# Patient Record
Sex: FEMALE | Race: WHITE | NOT HISPANIC OR LATINO | Employment: UNEMPLOYED | URBAN - METROPOLITAN AREA
[De-identification: names, ages, dates, MRNs, and addresses within clinical notes are randomized per-mention and may not be internally consistent; named-entity substitution may affect disease eponyms.]

---

## 2023-02-27 LAB
ANTI-CENTROMERE: <0.2 AI
C REACTIVE PROTEIN (MG/L) IN SER/PLAS: 0.12 MG/DL
RHEUMATOID FACTOR (IU/ML) IN SERUM OR PLASMA: <10 IU/ML (ref 0–15)

## 2023-02-28 LAB — ANTI-NUCLEAR ANTIBODY (ANA): NEGATIVE

## 2023-09-27 PROBLEM — K58.9 IBS (IRRITABLE BOWEL SYNDROME): Status: ACTIVE | Noted: 2023-09-27

## 2023-09-27 PROBLEM — N94.6 DYSMENORRHEA: Status: ACTIVE | Noted: 2023-09-27

## 2023-09-27 PROBLEM — I73.00 RAYNAUD'S PHENOMENON: Status: ACTIVE | Noted: 2023-09-27

## 2023-09-27 PROBLEM — F41.9 ANXIETY AND DEPRESSION: Status: ACTIVE | Noted: 2023-09-27

## 2023-09-27 PROBLEM — F32.A ANXIETY AND DEPRESSION: Status: ACTIVE | Noted: 2023-09-27

## 2023-09-27 PROBLEM — N83.202 HEMORRHAGIC CYST OF LEFT OVARY: Status: ACTIVE | Noted: 2023-09-27

## 2023-09-27 PROBLEM — G44.209 TENSION HEADACHE: Status: ACTIVE | Noted: 2023-09-27

## 2023-09-27 RX ORDER — LORAZEPAM 0.5 MG/1
0.5 TABLET ORAL DAILY PRN
COMMUNITY

## 2023-09-27 RX ORDER — HYDROXYZINE PAMOATE 100 MG/1
CAPSULE ORAL
COMMUNITY
End: 2023-10-16

## 2023-09-27 RX ORDER — NORETHINDRONE ACETATE AND ETHINYL ESTRADIOL 1MG-20(21)
1 KIT ORAL DAILY
COMMUNITY
Start: 2022-07-11 | End: 2023-10-16

## 2023-09-27 RX ORDER — AMLODIPINE BESYLATE 2.5 MG/1
1 TABLET ORAL DAILY
COMMUNITY
Start: 2023-02-20 | End: 2023-10-16

## 2023-10-03 ENCOUNTER — APPOINTMENT (OUTPATIENT)
Dept: RHEUMATOLOGY | Facility: CLINIC | Age: 23
End: 2023-10-03
Payer: COMMERCIAL

## 2023-10-06 ENCOUNTER — TELEPHONE (OUTPATIENT)
Dept: PRIMARY CARE | Facility: CLINIC | Age: 23
End: 2023-10-06
Payer: COMMERCIAL

## 2023-10-06 DIAGNOSIS — N39.0 ACUTE LOWER UTI: Primary | ICD-10-CM

## 2023-10-06 RX ORDER — CIPROFLOXACIN 500 MG/1
500 TABLET ORAL 2 TIMES DAILY
Qty: 14 TABLET | Refills: 0 | Status: SHIPPED | OUTPATIENT
Start: 2023-10-06 | End: 2023-10-18 | Stop reason: ALTCHOICE

## 2023-10-06 NOTE — TELEPHONE ENCOUNTER
Pt went to urgent care 10/1 & -she thought she had uti.  Came back negative.  Pain in back & pelvic area-worse w/urination.  [phone]  No allergies  Will make cpe

## 2023-10-14 ASSESSMENT — ENCOUNTER SYMPTOMS
BACK PAIN: 1
WEAKNESS: 0
PARESTHESIAS: 0
DYSURIA: 1
PERIANAL NUMBNESS: 0
WEIGHT LOSS: 0
FEVER: 0
ABDOMINAL PAIN: 0
TINGLING: 0
PARESIS: 0
BOWEL INCONTINENCE: 0
LEG PAIN: 1
HEADACHES: 0
NUMBNESS: 0

## 2023-10-16 ENCOUNTER — APPOINTMENT (OUTPATIENT)
Dept: PRIMARY CARE | Facility: CLINIC | Age: 23
End: 2023-10-16
Payer: COMMERCIAL

## 2023-10-16 RX ORDER — NITROFURANTOIN 25; 75 MG/1; MG/1
100 CAPSULE ORAL EVERY 12 HOURS
COMMUNITY
Start: 2023-10-01 | End: 2023-10-06

## 2023-10-16 RX ORDER — MULTIVITAMIN
TABLET ORAL
COMMUNITY
End: 2023-10-18 | Stop reason: ALTCHOICE

## 2023-10-16 RX ORDER — LITHIUM CARBONATE 450 MG/1
450 TABLET ORAL NIGHTLY
COMMUNITY
Start: 2023-10-03 | End: 2024-02-19 | Stop reason: WASHOUT

## 2023-10-16 RX ORDER — CLONIDINE HYDROCHLORIDE 0.1 MG/1
TABLET ORAL
COMMUNITY
Start: 2023-09-01 | End: 2023-10-18 | Stop reason: ALTCHOICE

## 2023-10-16 RX ORDER — FLUOXETINE HYDROCHLORIDE 20 MG/1
CAPSULE ORAL
COMMUNITY
End: 2023-10-18 | Stop reason: ALTCHOICE

## 2023-10-16 RX ORDER — CARBAMAZEPINE 100 MG/1
100 TABLET, EXTENDED RELEASE ORAL 2 TIMES DAILY
COMMUNITY
Start: 2022-11-03 | End: 2023-10-18 | Stop reason: ALTCHOICE

## 2023-10-16 RX ORDER — CYANOCOBALAMIN (VITAMIN B-12) 500 MCG
TABLET ORAL
COMMUNITY
Start: 2023-09-20

## 2023-10-16 RX ORDER — AMLODIPINE BESYLATE 5 MG/1
5 TABLET ORAL DAILY
COMMUNITY
Start: 2023-09-08 | End: 2023-12-11 | Stop reason: SDUPTHER

## 2023-10-16 RX ORDER — NORTRIPTYLINE HYDROCHLORIDE 10 MG/1
CAPSULE ORAL
COMMUNITY
Start: 2023-09-25 | End: 2023-10-18 | Stop reason: ALTCHOICE

## 2023-10-18 ENCOUNTER — OFFICE VISIT (OUTPATIENT)
Dept: PRIMARY CARE | Facility: CLINIC | Age: 23
End: 2023-10-18
Payer: COMMERCIAL

## 2023-10-18 VITALS
BODY MASS INDEX: 20.55 KG/M2 | OXYGEN SATURATION: 99 % | DIASTOLIC BLOOD PRESSURE: 86 MMHG | SYSTOLIC BLOOD PRESSURE: 122 MMHG | HEART RATE: 100 BPM | WEIGHT: 114.2 LBS

## 2023-10-18 DIAGNOSIS — N39.0 ACUTE LOWER UTI: Primary | ICD-10-CM

## 2023-10-18 PROBLEM — M99.09 SOMATIC DYSFUNCTION OF BACK: Status: ACTIVE | Noted: 2023-10-18

## 2023-10-18 PROBLEM — R63.4 WEIGHT LOSS: Status: ACTIVE | Noted: 2023-10-18

## 2023-10-18 PROBLEM — F41.9 ANXIETY: Status: ACTIVE | Noted: 2023-10-18

## 2023-10-18 PROBLEM — R51.9 ACUTE HEADACHE: Status: ACTIVE | Noted: 2023-10-18

## 2023-10-18 PROBLEM — R30.0 DYSURIA: Status: ACTIVE | Noted: 2023-10-18

## 2023-10-18 PROBLEM — T14.91XA SUICIDE ATTEMPT (MULTI): Status: ACTIVE | Noted: 2023-10-18

## 2023-10-18 PROBLEM — N83.209 HEMORRHAGIC CYST OF OVARY: Status: ACTIVE | Noted: 2023-05-18

## 2023-10-18 PROBLEM — F43.10 POSTTRAUMATIC STRESS DISORDER: Status: ACTIVE | Noted: 2023-09-27

## 2023-10-18 PROBLEM — F50.9 EATING DISORDER: Status: ACTIVE | Noted: 2023-10-18

## 2023-10-18 PROBLEM — F31.81 BIPOLAR II DISORDER (MULTI): Status: ACTIVE | Noted: 2023-10-16

## 2023-10-18 PROBLEM — T50.901A DRUG OVERDOSE: Status: ACTIVE | Noted: 2023-10-18

## 2023-10-18 PROBLEM — F60.3 BORDERLINE PERSONALITY DISORDER (MULTI): Status: ACTIVE | Noted: 2023-10-18

## 2023-10-18 LAB
APPEARANCE UR: CLEAR
BILIRUB UR QL STRIP: NEGATIVE
COLOR UR: YELLOW
GLUCOSE UR STRIP-MCNC: NEGATIVE MG/DL
HGB UR QL STRIP: NEGATIVE
KETONES UR STRIP-MCNC: NEGATIVE MG/DL
LEUKOCYTE ESTERASE UR QL STRIP: NEGATIVE
NITRITE UR QL STRIP: NEGATIVE
PH UR STRIP: 7 [PH]
PROT UR STRIP-MCNC: NEGATIVE MG/DL
SP GR UR STRIP.AUTO: 1.01
UROBILINOGEN UR STRIP-ACNC: 0.2 E.U./DL

## 2023-10-18 PROCEDURE — 81003 URINALYSIS AUTO W/O SCOPE: CPT | Performed by: STUDENT IN AN ORGANIZED HEALTH CARE EDUCATION/TRAINING PROGRAM

## 2023-10-18 PROCEDURE — 99213 OFFICE O/P EST LOW 20 MIN: CPT | Performed by: STUDENT IN AN ORGANIZED HEALTH CARE EDUCATION/TRAINING PROGRAM

## 2023-10-18 PROCEDURE — 87086 URINE CULTURE/COLONY COUNT: CPT

## 2023-10-18 PROCEDURE — 1036F TOBACCO NON-USER: CPT | Performed by: STUDENT IN AN ORGANIZED HEALTH CARE EDUCATION/TRAINING PROGRAM

## 2023-10-18 RX ORDER — CIPROFLOXACIN 500 MG/1
500 TABLET ORAL 2 TIMES DAILY
Qty: 14 TABLET | Refills: 0 | Status: SHIPPED | OUTPATIENT
Start: 2023-10-18 | End: 2023-10-25

## 2023-10-18 RX ORDER — MULTIVITAMIN
1 TABLET ORAL DAILY
COMMUNITY
Start: 2009-12-02 | End: 2023-10-18 | Stop reason: ALTCHOICE

## 2023-10-18 RX ORDER — PAROXETINE HYDROCHLORIDE 20 MG/1
20 TABLET, FILM COATED ORAL DAILY
COMMUNITY
Start: 2023-03-02 | End: 2023-10-18 | Stop reason: ALTCHOICE

## 2023-10-18 RX ORDER — OLANZAPINE 2.5 MG/1
2.5 TABLET ORAL NIGHTLY
COMMUNITY
Start: 2022-12-29 | End: 2023-10-18 | Stop reason: ALTCHOICE

## 2023-10-18 RX ORDER — PAROXETINE 10 MG/1
5 TABLET, FILM COATED ORAL
COMMUNITY
Start: 2023-07-07 | End: 2023-10-18 | Stop reason: ALTCHOICE

## 2023-10-18 RX ORDER — MAGNESIUM L-LACTATE 84 MG
84 TABLET, EXTENDED RELEASE ORAL DAILY
COMMUNITY
Start: 2022-04-29

## 2023-10-18 ASSESSMENT — PAIN SCALES - GENERAL: PAINLEVEL: 0-NO PAIN

## 2023-10-18 ASSESSMENT — ENCOUNTER SYMPTOMS: DEPRESSION: 0

## 2023-10-18 NOTE — PROGRESS NOTES
Subjective   Patient ID: Alondra Hood is a 23 y.o. female who presents for Follow-up (UTI. Still has some back pain.).    HPI comes in for flank pain UTI follow-up    Review of Systems  Constitutional: NO F, chills, or sweats  Eyes: no blurred vision or visual disturbance  ENT: no hearing loss, no congestion, no nasal discharge, no hoarseness and no sore throat.   Cardiovascular: no chest pain, no edema, no palps and no syncope.   Respiratory: no cough,no s.o.b. and no wheezing  Gastrointestinal: no abdominal pain, No C/D no N/V, no blood in stools  Genitourinary: Some mild flank pain and increased urinary frequency  Objective   /86 (BP Location: Right arm, Patient Position: Sitting, BP Cuff Size: Adult)   Pulse 100   Wt 51.8 kg (114 lb 3.2 oz)   SpO2 99%   BMI 20.55 kg/m²     Physical Exam  gen- a & o x 3, nad, pleasant  heent- eomi, perrla, ear canals patent, TM's non-erythematous, no fluid, frontal and maxillary sinus's nontender  neck- supple, nontender, no palpable or enlarged nodes, no thyromegaly  heart- rrr, no murmurs  lungs- cta b/l , no w/r/r  chest- symmetric, nontender  ab- soft, positive mild bilateral CVA TTP  Assessment/Plan     1.  Concern for UTI.  She has mild flank pain bilaterally.  She originally went to urgent care she also had increased urinary frequency.  She is somewhat improved after 1 course of antibiotics.  We will culture her urine.  Advised on Cipro p.o. twice daily x7 more days.

## 2023-10-18 NOTE — PATIENT INSTRUCTIONS
1.  Concern for UTI.  She has mild flank pain bilaterally.  She originally went to urgent care she also had increased urinary frequency.  She is somewhat improved after 1 course of antibiotics.  We will culture her urine.  Advised on Cipro p.o. twice daily x7 more days.    Will send urine for culture and sensitivity.  Instructed the patient to drink plenty of fluids.  May try cranberry capsules over the counter for prevention.  Discussed ways of reducing chance of recurrence including voiding when sense the urge, urinating after sex and baths/swimming/hot tubs, and wearing cotton underwear.   Take Tylenol or Motrin/Aleve as needed for pain.   Complete all antibiotics until completes.   If you were prescribed Pyridium (phenazopyridine) for urinary burning, only take up to 3 days.

## 2023-10-19 LAB — BACTERIA UR CULT: NORMAL

## 2023-11-02 ENCOUNTER — OFFICE VISIT (OUTPATIENT)
Dept: OBSTETRICS AND GYNECOLOGY | Facility: CLINIC | Age: 23
End: 2023-11-02
Payer: COMMERCIAL

## 2023-11-02 VITALS
WEIGHT: 114.1 LBS | SYSTOLIC BLOOD PRESSURE: 115 MMHG | DIASTOLIC BLOOD PRESSURE: 77 MMHG | HEIGHT: 63 IN | BODY MASS INDEX: 20.21 KG/M2

## 2023-11-02 DIAGNOSIS — R10.2 PELVIC PAIN: Primary | ICD-10-CM

## 2023-11-02 DIAGNOSIS — R30.0 DYSURIA: ICD-10-CM

## 2023-11-02 LAB
POC APPEARANCE, URINE: CLEAR
POC BILIRUBIN, URINE: NEGATIVE
POC BLOOD, URINE: NEGATIVE
POC COLOR, URINE: YELLOW
POC GLUCOSE, URINE: NEGATIVE MG/DL
POC KETONES, URINE: NEGATIVE MG/DL
POC LEUKOCYTES, URINE: NEGATIVE
POC NITRITE,URINE: NEGATIVE
POC PH, URINE: 7 PH
POC PROTEIN, URINE: NEGATIVE MG/DL
POC SPECIFIC GRAVITY, URINE: <=1.005
POC UROBILINOGEN, URINE: 0.2 EU/DL

## 2023-11-02 PROCEDURE — 87086 URINE CULTURE/COLONY COUNT: CPT

## 2023-11-02 PROCEDURE — 1036F TOBACCO NON-USER: CPT | Performed by: OBSTETRICS & GYNECOLOGY

## 2023-11-02 PROCEDURE — 81003 URINALYSIS AUTO W/O SCOPE: CPT | Performed by: OBSTETRICS & GYNECOLOGY

## 2023-11-02 PROCEDURE — 99213 OFFICE O/P EST LOW 20 MIN: CPT | Performed by: OBSTETRICS & GYNECOLOGY

## 2023-11-02 NOTE — PROGRESS NOTES
Subjective   Patient ID: Alondra Hood is a 23 y.o. female who presents for Pelvic Pain (Patient here for c/o pelvic pain--was treated with 2 rounds of antibx by pcp and pelvic pain is still there--denies any irregular bleeding or difficulty urinating-states theres a slight odor to her urine/Pt would like a chaperone during exam-- Ryanne Pena CMA was present).  HPI  Patient is a 23-year-old  0 para 0 white female whose last menstrual period was about 3 weeks ago she said they are typically 1 month apart she will bleed for about 6 to 7 days.  Patient has never been sexually active nor has she ever penetrated the vagina.  Several years ago did take birth control pills for about 1 to 2 weeks then stop because of symptoms.  She admits to regular bowel movements.  Patient states when she urinates she has significant low pelvic and back pain.  She said this has been going on for about a month she did see her internist diagnosed with UTI and treated then symptoms recurred went to urgent care and told urine culture was negative.  Continues to have symptoms.  Denies fever or chills.  Denies blood in the urine or stool.  Medical history significant for anxiety and depression.  She denies cigarette drug or alcohol use.  Currently works at [employer].  Family history negative for breast pelvic or colon cancer.  Patient has never had a Pap smear.  Denies fibroids, pelvic infections or STDs.  Remote history of ovarian cyst.  Review of Systems    Objective   Physical Exam  Abdomen is soft mildly tender to deep palpation in the right lower quadrant without rebound or guarding.  Pelvic: External genitalia normal, unable to pass finger or small speculum into the vagina therefore procedure discontinued.    Assessment/Plan   Pelvic pain and dysuria, will get pelvic ultrasound and urine culture.  In light of patient not being sexually active unlikely concern for STDs.  We will contact patient with results and proceed  accordingly.

## 2023-11-03 LAB — BACTERIA UR CULT: ABNORMAL

## 2023-11-04 DIAGNOSIS — N30.90 CYSTITIS: Primary | ICD-10-CM

## 2023-11-04 RX ORDER — AMOXICILLIN 500 MG/1
500 TABLET, FILM COATED ORAL EVERY 8 HOURS SCHEDULED
Qty: 21 TABLET | Refills: 0 | Status: SHIPPED | OUTPATIENT
Start: 2023-11-04 | End: 2023-11-11

## 2023-11-04 NOTE — RESULT ENCOUNTER NOTE
Spoke to patient on the phone.  Informed her of urine culture positive for group B strep.  Electronically sent amoxicillin 500 mg 3 times daily x7 days to pharmacy on file.  Patient is scheduled to have pelvic ultrasound next week.  We will contact her with ultrasound results.

## 2023-11-10 ENCOUNTER — HOSPITAL ENCOUNTER (OUTPATIENT)
Dept: RADIOLOGY | Facility: HOSPITAL | Age: 23
Discharge: HOME | End: 2023-11-10
Payer: COMMERCIAL

## 2023-11-10 DIAGNOSIS — R10.2 PELVIC PAIN: ICD-10-CM

## 2023-11-10 PROCEDURE — 76856 US EXAM PELVIC COMPLETE: CPT

## 2023-11-10 PROCEDURE — 76830 TRANSVAGINAL US NON-OB: CPT | Performed by: RADIOLOGY

## 2023-11-10 PROCEDURE — 76856 US EXAM PELVIC COMPLETE: CPT | Performed by: RADIOLOGY

## 2023-11-16 ENCOUNTER — TELEPHONE (OUTPATIENT)
Dept: OBSTETRICS AND GYNECOLOGY | Facility: CLINIC | Age: 23
End: 2023-11-16

## 2023-11-17 ENCOUNTER — TELEPHONE (OUTPATIENT)
Dept: OBSTETRICS AND GYNECOLOGY | Facility: CLINIC | Age: 23
End: 2023-11-17
Payer: COMMERCIAL

## 2023-11-17 NOTE — TELEPHONE ENCOUNTER
Spoke to patient on the phone.  Reviewed recent normal pelvic ultrasound.  Patient did complete her antibiotics for urinary tract infection.  Patient states symptoms unchanged since office visit.  I did suggest starting a low-dose birth control pill.  Patient said she would like to think about this but for right now would prefer not to.  Recommend she follow-up in office or call if she has any other questions.

## 2023-11-23 ASSESSMENT — ENCOUNTER SYMPTOMS
PARESTHESIAS: 0
PARESIS: 0
HEADACHES: 1
BOWEL INCONTINENCE: 0
NUMBNESS: 0
FEVER: 0
WEAKNESS: 0
DYSURIA: 1
BACK PAIN: 1
PERIANAL NUMBNESS: 0
TINGLING: 0
WEIGHT LOSS: 0
ABDOMINAL PAIN: 0
LEG PAIN: 1

## 2023-11-27 ENCOUNTER — OFFICE VISIT (OUTPATIENT)
Dept: PRIMARY CARE | Facility: CLINIC | Age: 23
End: 2023-11-27
Payer: COMMERCIAL

## 2023-11-27 VITALS — BODY MASS INDEX: 20.37 KG/M2 | WEIGHT: 113.2 LBS | SYSTOLIC BLOOD PRESSURE: 116 MMHG | DIASTOLIC BLOOD PRESSURE: 86 MMHG

## 2023-11-27 DIAGNOSIS — N39.0 ACUTE LOWER UTI: Primary | ICD-10-CM

## 2023-11-27 DIAGNOSIS — R10.2 PELVIC PAIN: ICD-10-CM

## 2023-11-27 LAB
APPEARANCE UR: CLEAR
BILIRUB UR QL STRIP: NEGATIVE
COLOR UR: YELLOW
GLUCOSE UR STRIP-MCNC: NEGATIVE MG/DL
HGB UR QL STRIP: NEGATIVE
KETONES UR STRIP-MCNC: NEGATIVE MG/DL
LEUKOCYTE ESTERASE UR QL STRIP: NEGATIVE
NITRITE UR QL STRIP: NEGATIVE
PH UR STRIP: 7.5 [PH]
PROT UR STRIP-MCNC: NEGATIVE MG/DL
SP GR UR STRIP.AUTO: 1.01
UROBILINOGEN UR STRIP-ACNC: 0.2 E.U./DL

## 2023-11-27 PROCEDURE — 1036F TOBACCO NON-USER: CPT | Performed by: STUDENT IN AN ORGANIZED HEALTH CARE EDUCATION/TRAINING PROGRAM

## 2023-11-27 PROCEDURE — 81003 URINALYSIS AUTO W/O SCOPE: CPT | Performed by: STUDENT IN AN ORGANIZED HEALTH CARE EDUCATION/TRAINING PROGRAM

## 2023-11-27 PROCEDURE — 87086 URINE CULTURE/COLONY COUNT: CPT

## 2023-11-27 PROCEDURE — 99213 OFFICE O/P EST LOW 20 MIN: CPT | Performed by: STUDENT IN AN ORGANIZED HEALTH CARE EDUCATION/TRAINING PROGRAM

## 2023-11-27 RX ORDER — NORTRIPTYLINE HYDROCHLORIDE 10 MG/1
10 CAPSULE ORAL DAILY
COMMUNITY
Start: 2023-10-24 | End: 2023-12-05

## 2023-11-27 ASSESSMENT — PAIN SCALES - GENERAL: PAINLEVEL: 5

## 2023-11-27 ASSESSMENT — ENCOUNTER SYMPTOMS: DEPRESSION: 0

## 2023-11-27 NOTE — PATIENT INSTRUCTIONS
1.  Continues have lower suprapubic pain lower back pain pelvic pain.  There is no improvement after antibiotics for UTI.  Urinalysis today in clinic is clear we will still culture.  She did have pelvic ultrasound which was normal.  CT abdomen pelvis ordered today for further evaluation.    Reagan Carson DO  for questions and f/u please call office   [facility] [phone] [facility] [phone]  any forms needed please fax   [facility] [phone] [facility] [phone]  for Physical therapy orders can call [phone]  for Radiology orders can call [phone]  for general referrals can call [phone]  for cardiac testing can call [phone]  for GI testing call [phone]

## 2023-11-27 NOTE — PROGRESS NOTES
Subjective   Patient ID: Alondra Hood is a 23 y.o. female who presents for UTI.    HPI continues now for several months to have lower anterior pelvic pain lower back pain.  Cramping and pain very day today throughout the month but did not correlate exactly with her menstrual cycles.  She was concerned for increased urinary frequency occasional dysuria she was recently treated for UTI with her GYN.  Recent pelvic ultrasound was normal    Review of Systems  Constitutional: NO F, chills, or sweats  Eyes: no blurred vision or visual disturbance  ENT: no hearing loss, no congestion, no nasal discharge, no hoarseness and no sore throat.   Cardiovascular: no chest pain, no edema, no palps and no syncope.   Respiratory: no cough,no s.o.b. and no wheezing  Gastrointestinal: no abdominal pain, No C/D no N/V, no blood in stools  Genitourinary: Several months now of UTI symptoms pelvic cramps  Objective   /86 (BP Location: Right arm, Patient Position: Sitting, BP Cuff Size: Adult)   Wt 51.3 kg (113 lb 3.2 oz)   LMP 10/12/2023   BMI 20.37 kg/m²     Physical Exam  gen- a & o x 3, nad, pleasant  ab- soft, nontender, no palpable organomegaly, postive bowel sounds    Assessment/Plan     1.  Continues have lower suprapubic pain lower back pain pelvic pain.  There is no improvement after antibiotics for UTI.  Urinalysis today in clinic is clear we will still culture.  She did have pelvic ultrasound which was normal.  CT abdomen pelvis ordered today for further evaluation.

## 2023-11-28 LAB — BACTERIA UR CULT: NORMAL

## 2023-12-12 ENCOUNTER — TELEPHONE (OUTPATIENT)
Dept: PRIMARY CARE | Facility: CLINIC | Age: 23
End: 2023-12-12
Payer: COMMERCIAL

## 2023-12-12 DIAGNOSIS — R10.2 PELVIC PAIN: Primary | ICD-10-CM

## 2023-12-12 NOTE — TELEPHONE ENCOUNTER
Christianne from radiology called and states that the pt insurance denied the Ct abdomen pelvis wo IV contrast but will approve CT abdomen pelvis wo IV and with IV constrast. Radiology states that you would have to place a new referral.

## 2023-12-13 ENCOUNTER — APPOINTMENT (OUTPATIENT)
Dept: RADIOLOGY | Facility: HOSPITAL | Age: 23
End: 2023-12-13
Payer: COMMERCIAL

## 2024-01-05 ENCOUNTER — ANCILLARY PROCEDURE (OUTPATIENT)
Dept: RADIOLOGY | Facility: CLINIC | Age: 24
End: 2024-01-05
Payer: COMMERCIAL

## 2024-01-05 DIAGNOSIS — R10.2 PELVIC PAIN: ICD-10-CM

## 2024-01-05 PROCEDURE — 2550000001 HC RX 255 CONTRASTS: Performed by: STUDENT IN AN ORGANIZED HEALTH CARE EDUCATION/TRAINING PROGRAM

## 2024-01-05 PROCEDURE — 74177 CT ABD & PELVIS W/CONTRAST: CPT | Performed by: STUDENT IN AN ORGANIZED HEALTH CARE EDUCATION/TRAINING PROGRAM

## 2024-01-05 PROCEDURE — 74177 CT ABD & PELVIS W/CONTRAST: CPT

## 2024-01-05 RX ADMIN — IOHEXOL 75 ML: 350 INJECTION, SOLUTION INTRAVENOUS at 08:19

## 2024-02-23 ENCOUNTER — OFFICE VISIT (OUTPATIENT)
Dept: OBSTETRICS AND GYNECOLOGY | Facility: CLINIC | Age: 24
End: 2024-02-23
Payer: COMMERCIAL

## 2024-02-23 VITALS
SYSTOLIC BLOOD PRESSURE: 124 MMHG | HEIGHT: 63 IN | WEIGHT: 110.5 LBS | BODY MASS INDEX: 19.58 KG/M2 | DIASTOLIC BLOOD PRESSURE: 81 MMHG

## 2024-02-23 DIAGNOSIS — G89.18 POSTOPERATIVE PAIN: ICD-10-CM

## 2024-02-23 DIAGNOSIS — N94.6 DYSMENORRHEA: Primary | ICD-10-CM

## 2024-02-23 PROCEDURE — 99213 OFFICE O/P EST LOW 20 MIN: CPT | Performed by: OBSTETRICS & GYNECOLOGY

## 2024-02-23 PROCEDURE — 1036F TOBACCO NON-USER: CPT | Performed by: OBSTETRICS & GYNECOLOGY

## 2024-02-23 RX ORDER — IBUPROFEN 600 MG/1
600 TABLET ORAL EVERY 8 HOURS PRN
Qty: 30 TABLET | Refills: 2 | Status: SHIPPED | OUTPATIENT
Start: 2024-02-23 | End: 2024-05-23

## 2024-02-23 RX ORDER — OXCARBAZEPINE 150 MG/1
150 TABLET, FILM COATED ORAL DAILY
COMMUNITY
Start: 2024-01-24

## 2024-02-23 RX ORDER — NORETHINDRONE 0.35 MG/1
1 TABLET ORAL DAILY
Qty: 28 TABLET | Refills: 11 | Status: SHIPPED | OUTPATIENT
Start: 2024-02-23 | End: 2025-02-22

## 2024-02-23 NOTE — PROGRESS NOTES
Subjective   Patient ID: Alondra Hood is a 23 y.o. female who presents for Menstrual Problem (Patient here for c/o very heavy, painful cycles/Pt states cycles are regular/Pt interested in bc options/Pt would like chaperone for any exam--Ryanne Pena CMA present).  HPI  Patient is a 23-year-old  0 para 0 white female whose last menstrual period was about February 10 she said they come every 30 to 33 days she will bleed anywhere from 6 to 7 days.  About 3 to 4 days before her menses she is gets significant dysmenorrhea she said it builds up until her periods stays about the same and then usually goes away after her.  Occasionally she will get some pain throughout her cycle.  She and I have addressed this issue in the past.  She did have a recent normal CAT scan and pelvic ultrasound.  Patient was on combination birth control pills in the past she took them for about 2 weeks she does have some mental health issues and thought they increase suicidal ideation.  Review of Systems    Objective   Physical Exam  Deferred  Assessment/Plan   Severe dysmenorrhea, patient I had a long discussion regarding symptoms.  We discussed retrying low-dose combination birth control pills or possibly progesterone only birth control pills.  Patient states she is willing to try, she informed me that if she does develop mental health symptoms or suicidal ideation she would discontinue and let me know.  We will try to take them for about 3 months and then she will follow-up in my office to reassess her symptoms.  I also prescribed her ibuprofen 600 mg to be taken 3 times a day during painful episodes.         Ron Napier MD 24 11:15 AM

## 2024-04-24 ENCOUNTER — APPOINTMENT (OUTPATIENT)
Dept: PRIMARY CARE | Facility: CLINIC | Age: 24
End: 2024-04-24
Payer: COMMERCIAL

## 2024-06-28 ENCOUNTER — APPOINTMENT (OUTPATIENT)
Dept: OBSTETRICS AND GYNECOLOGY | Facility: CLINIC | Age: 24
End: 2024-06-28
Payer: COMMERCIAL

## 2024-06-28 VITALS
SYSTOLIC BLOOD PRESSURE: 114 MMHG | BODY MASS INDEX: 19.05 KG/M2 | HEIGHT: 63 IN | WEIGHT: 107.5 LBS | DIASTOLIC BLOOD PRESSURE: 78 MMHG

## 2024-06-28 DIAGNOSIS — N92.0 MENORRHAGIA WITH REGULAR CYCLE: Primary | ICD-10-CM

## 2024-06-28 DIAGNOSIS — N94.6 DYSMENORRHEA: ICD-10-CM

## 2024-06-28 PROCEDURE — 99213 OFFICE O/P EST LOW 20 MIN: CPT | Performed by: OBSTETRICS & GYNECOLOGY

## 2024-06-28 PROCEDURE — 1036F TOBACCO NON-USER: CPT | Performed by: OBSTETRICS & GYNECOLOGY

## 2024-06-28 NOTE — PROGRESS NOTES
Subjective   Patient ID: Alondra Hood is a 23 y.o. female who presents for Follow-up (Patient here for follow up to micronor rx/Pt states she stopped taking--was affecting her mental health-still has heavy bleeding during cycles/Pt would like chaperone present-Ryanne Pena CMA is present).  HPI  Patient is a 23-year-old  0 para 0 white female whose last menstrual period was approximately 2 weeks ago.  Patient was last seen 2024 complaining of heavy menses with significant dysmenorrhea.  She did have a CAT scan and a pelvic ultrasound that were normal.  Normal thyroid levels.  She had been on combination birth control pills in the past and said they significantly affected her mood sometimes even considering suicidal ideation.  She and I had a long discussion regarding management and she was started on progesterone only birth control pills.  She said after 2 weeks she had similar emotional feelings.  She then stopped the progesterone only birth control pills.  She said since that time she continues to have heavy menses and dysmenorrhea.  Review of Systems    Objective   Physical Exam  Physical exam deferred  Assessment/Plan   Menorrhagia and dysmenorrhea, she and I discussed above at length.  We are quite limited to what we can use secondary to her mental health symptoms.  She is interested in Nexplanon.  She understands that she may develop similar symptoms and that we could certainly take it out if needed.  She would like to try it.  Her goal is to lighten her menses and perhaps help with dysmenorrhea.  Risk benefits and alternatives explained and she agreed.  Will order Nexplanon and have her follow-up for placement.     Ron Napier MD 24 11:25 AM

## 2024-09-06 PROBLEM — H91.93 BILATERAL HEARING LOSS: Status: ACTIVE | Noted: 2024-09-06

## 2024-09-06 PROBLEM — R42 DIZZINESS: Status: ACTIVE | Noted: 2024-04-20

## 2024-09-09 ENCOUNTER — ANCILLARY PROCEDURE (OUTPATIENT)
Dept: CARDIOLOGY | Facility: CLINIC | Age: 24
End: 2024-09-09
Payer: COMMERCIAL

## 2024-09-09 ENCOUNTER — APPOINTMENT (OUTPATIENT)
Dept: CARDIOLOGY | Facility: CLINIC | Age: 24
End: 2024-09-09
Payer: COMMERCIAL

## 2024-09-09 ENCOUNTER — OFFICE VISIT (OUTPATIENT)
Dept: CARDIOLOGY | Facility: CLINIC | Age: 24
End: 2024-09-09
Payer: COMMERCIAL

## 2024-09-09 VITALS
DIASTOLIC BLOOD PRESSURE: 60 MMHG | OXYGEN SATURATION: 98 % | HEART RATE: 98 BPM | BODY MASS INDEX: 20.12 KG/M2 | RESPIRATION RATE: 16 BRPM | SYSTOLIC BLOOD PRESSURE: 122 MMHG | WEIGHT: 111.8 LBS

## 2024-09-09 DIAGNOSIS — R00.2 HEART PALPITATIONS: ICD-10-CM

## 2024-09-09 DIAGNOSIS — R55 NEAR SYNCOPE: ICD-10-CM

## 2024-09-09 DIAGNOSIS — R42 DIZZINESS: ICD-10-CM

## 2024-09-09 DIAGNOSIS — R55 NEAR SYNCOPE: Primary | ICD-10-CM

## 2024-09-09 PROCEDURE — 93246 EXT ECG>7D<15D RECORDING: CPT

## 2024-09-09 PROCEDURE — 99214 OFFICE O/P EST MOD 30 MIN: CPT | Mod: 25 | Performed by: STUDENT IN AN ORGANIZED HEALTH CARE EDUCATION/TRAINING PROGRAM

## 2024-09-09 PROCEDURE — 93005 ELECTROCARDIOGRAM TRACING: CPT | Performed by: STUDENT IN AN ORGANIZED HEALTH CARE EDUCATION/TRAINING PROGRAM

## 2024-09-09 PROCEDURE — 1036F TOBACCO NON-USER: CPT | Performed by: STUDENT IN AN ORGANIZED HEALTH CARE EDUCATION/TRAINING PROGRAM

## 2024-09-09 PROCEDURE — 93010 ELECTROCARDIOGRAM REPORT: CPT | Performed by: STUDENT IN AN ORGANIZED HEALTH CARE EDUCATION/TRAINING PROGRAM

## 2024-09-09 PROCEDURE — 99204 OFFICE O/P NEW MOD 45 MIN: CPT | Performed by: STUDENT IN AN ORGANIZED HEALTH CARE EDUCATION/TRAINING PROGRAM

## 2024-09-09 RX ORDER — GABAPENTIN 100 MG/1
100 CAPSULE ORAL
COMMUNITY
Start: 2024-09-05

## 2024-09-09 RX ORDER — IBUPROFEN 400 MG/1
400 TABLET ORAL
COMMUNITY

## 2024-09-09 RX ORDER — CITALOPRAM 20 MG/1
20 TABLET, FILM COATED ORAL DAILY
COMMUNITY
Start: 2024-02-21

## 2024-09-09 ASSESSMENT — ENCOUNTER SYMPTOMS
NEUROLOGICAL NEGATIVE: 1
PALPITATIONS: 1
DYSPNEA ON EXERTION: 0
CONSTITUTIONAL NEGATIVE: 1
ORTHOPNEA: 0
RESPIRATORY NEGATIVE: 1
PSYCHIATRIC NEGATIVE: 1
ENDOCRINE NEGATIVE: 1
PND: 0
EYES NEGATIVE: 1
MUSCULOSKELETAL NEGATIVE: 1
HEMATOLOGIC/LYMPHATIC NEGATIVE: 1
ALLERGIC/IMMUNOLOGIC NEGATIVE: 1
GASTROINTESTINAL NEGATIVE: 1
NEAR-SYNCOPE: 1
SYNCOPE: 0
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
## 2024-09-09 NOTE — PATIENT INSTRUCTIONS
For your near passing out and positional rapid heart rate episodes we will further evaluate with a heart ultrasound (echocardiogram), a heart monitor, and autonomic / tilt table testing.     We will see you back in clinic after testing.     Thank you for your visit today. Please contact our office (via [facility] or phone) with any additional questions.     [facility] [place]    Manuela, RN/Clinic Nurse for:    Dr. Kadi Jeter    [address], [address]  [place] [address]    Phone: [phone] Press Option 5 then Option 3 to speak with the Clinic Nurse (Manuela)    _____    To Reach:    Billing Questions -    [phone]  Scheduling / Rescheduling -  Option 1  Refills / Medication Requests -  Option 3  General Office / [place] -  Option 4  Results -     Option 6  Medical Records -    Option 7  Repeat Options -    Option 9

## 2024-09-17 ENCOUNTER — HOSPITAL ENCOUNTER (OUTPATIENT)
Dept: CARDIOLOGY | Facility: CLINIC | Age: 24
Discharge: HOME | End: 2024-09-17
Payer: COMMERCIAL

## 2024-09-17 VITALS — BODY MASS INDEX: 19.98 KG/M2 | WEIGHT: 111 LBS

## 2024-09-17 DIAGNOSIS — R55 NEAR SYNCOPE: ICD-10-CM

## 2024-09-17 LAB
AORTIC VALVE MEAN GRADIENT: 2.5 MMHG
AORTIC VALVE PEAK VELOCITY: 1.12 M/S
AV PEAK GRADIENT: 5 MMHG
AVA (PEAK VEL): 2.22 CM2
AVA (VTI): 2.18 CM2
EJECTION FRACTION APICAL 4 CHAMBER: 62.8
EJECTION FRACTION: 63 %
LEFT VENTRICLE INTERNAL DIMENSION DIASTOLE: 3.91 CM (ref 3.5–6)
LEFT VENTRICULAR OUTFLOW TRACT DIAMETER: 1.92 CM
MITRAL VALVE E/A RATIO: 1.59
RIGHT VENTRICLE FREE WALL PEAK S': 16 CM/S
RIGHT VENTRICLE PEAK SYSTOLIC PRESSURE: 20 MMHG
TRICUSPID ANNULAR PLANE SYSTOLIC EXCURSION: 2.2 CM

## 2024-09-17 PROCEDURE — 93306 TTE W/DOPPLER COMPLETE: CPT

## 2024-09-17 PROCEDURE — 93306 TTE W/DOPPLER COMPLETE: CPT | Performed by: STUDENT IN AN ORGANIZED HEALTH CARE EDUCATION/TRAINING PROGRAM

## 2024-09-30 ENCOUNTER — TELEPHONE (OUTPATIENT)
Dept: CARDIOLOGY | Facility: CLINIC | Age: 24
End: 2024-09-30
Payer: COMMERCIAL

## 2024-09-30 NOTE — TELEPHONE ENCOUNTER
----- Message from Дмитрий Wallis sent at 9/27/2024  6:49 PM EDT -----  Please let patient know her heart monitor was normal    Дмитрий Wallis MD  ----- Message -----  From: James C Ramicone, DO  Sent: 9/23/2024   9:45 PM EDT  To: Дмитрий Wallis MD

## 2024-09-30 NOTE — TELEPHONE ENCOUNTER
----- Message from Дмитрий Wallis sent at 9/27/2024  6:45 PM EDT -----  Please let patient know her echo was normal.     Дмитрий Wallis MD  ----- Message -----  From: Emilie Syngo - Cardiology Results In  Sent: 9/17/2024   2:25 PM EDT  To: Дмитрий Wallis MD

## 2024-10-21 PROBLEM — G47.00 INSOMNIA, UNSPECIFIED: Status: ACTIVE | Noted: 2024-09-04

## 2024-10-23 ENCOUNTER — APPOINTMENT (OUTPATIENT)
Dept: PRIMARY CARE | Facility: CLINIC | Age: 24
End: 2024-10-23
Payer: COMMERCIAL

## 2024-10-23 ASSESSMENT — ENCOUNTER SYMPTOMS
DIARRHEA: 1
SORE THROAT: 0
ABDOMINAL PAIN: 0
COUGH: 0
VOMITING: 0
HEADACHES: 1
RHINORRHEA: 1
NECK PAIN: 0

## 2024-10-28 ENCOUNTER — APPOINTMENT (OUTPATIENT)
Dept: PRIMARY CARE | Facility: CLINIC | Age: 24
End: 2024-10-28
Payer: COMMERCIAL

## 2024-11-08 ENCOUNTER — OFFICE VISIT (OUTPATIENT)
Dept: CARDIOLOGY | Facility: CLINIC | Age: 24
End: 2024-11-08
Payer: COMMERCIAL

## 2024-11-08 VITALS
OXYGEN SATURATION: 98 % | HEART RATE: 96 BPM | DIASTOLIC BLOOD PRESSURE: 72 MMHG | SYSTOLIC BLOOD PRESSURE: 103 MMHG | WEIGHT: 126 LBS | BODY MASS INDEX: 22.68 KG/M2

## 2024-11-08 DIAGNOSIS — R42 DIZZINESS: ICD-10-CM

## 2024-11-08 DIAGNOSIS — R55 NEAR SYNCOPE: ICD-10-CM

## 2024-11-08 DIAGNOSIS — R00.2 HEART PALPITATIONS: Primary | ICD-10-CM

## 2024-11-08 PROCEDURE — 1036F TOBACCO NON-USER: CPT | Performed by: STUDENT IN AN ORGANIZED HEALTH CARE EDUCATION/TRAINING PROGRAM

## 2024-11-08 PROCEDURE — 99213 OFFICE O/P EST LOW 20 MIN: CPT | Performed by: STUDENT IN AN ORGANIZED HEALTH CARE EDUCATION/TRAINING PROGRAM

## 2024-11-08 RX ORDER — PROPRANOLOL HYDROCHLORIDE 60 MG/1
60 CAPSULE, EXTENDED RELEASE ORAL DAILY
Qty: 90 CAPSULE | Refills: 1 | Status: SHIPPED | OUTPATIENT
Start: 2024-11-08 | End: 2025-05-07

## 2024-11-08 ASSESSMENT — ENCOUNTER SYMPTOMS
PALPITATIONS: 1
MUSCULOSKELETAL NEGATIVE: 1
CONSTITUTIONAL NEGATIVE: 1
RESPIRATORY NEGATIVE: 1
NEUROLOGICAL NEGATIVE: 1
ALLERGIC/IMMUNOLOGIC NEGATIVE: 1
SYNCOPE: 0
DYSPNEA ON EXERTION: 0
PND: 0
PSYCHIATRIC NEGATIVE: 1
GASTROINTESTINAL NEGATIVE: 1
NEAR-SYNCOPE: 1
EYES NEGATIVE: 1
ENDOCRINE NEGATIVE: 1
ORTHOPNEA: 0
HEMATOLOGIC/LYMPHATIC NEGATIVE: 1
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
## 2024-11-15 ENCOUNTER — APPOINTMENT (OUTPATIENT)
Dept: NEUROLOGY | Facility: HOSPITAL | Age: 24
End: 2024-11-15
Payer: COMMERCIAL

## 2025-01-07 ENCOUNTER — APPOINTMENT (OUTPATIENT)
Dept: OBSTETRICS AND GYNECOLOGY | Facility: CLINIC | Age: 25
End: 2025-01-07
Payer: COMMERCIAL

## 2025-01-08 ENCOUNTER — PATIENT MESSAGE (OUTPATIENT)
Dept: CARDIOLOGY | Facility: CLINIC | Age: 25
End: 2025-01-08
Payer: COMMERCIAL

## 2025-01-09 DIAGNOSIS — I73.00 RAYNAUD'S PHENOMENON WITHOUT GANGRENE: ICD-10-CM

## 2025-01-09 RX ORDER — AMLODIPINE BESYLATE 5 MG/1
5 TABLET ORAL DAILY
Qty: 90 TABLET | Refills: 3 | Status: SHIPPED | OUTPATIENT
Start: 2025-01-09 | End: 2026-01-09

## 2025-01-27 ENCOUNTER — APPOINTMENT (OUTPATIENT)
Dept: PRIMARY CARE | Facility: CLINIC | Age: 25
End: 2025-01-27
Payer: COMMERCIAL

## 2025-02-25 ENCOUNTER — APPOINTMENT (OUTPATIENT)
Dept: INTEGRATIVE MEDICINE | Facility: CLINIC | Age: 25
End: 2025-02-25
Payer: COMMERCIAL

## 2025-03-17 ENCOUNTER — APPOINTMENT (OUTPATIENT)
Dept: CARDIOLOGY | Facility: CLINIC | Age: 25
End: 2025-03-17
Payer: COMMERCIAL

## 2025-03-24 ENCOUNTER — APPOINTMENT (OUTPATIENT)
Dept: OBSTETRICS AND GYNECOLOGY | Facility: CLINIC | Age: 25
End: 2025-03-24
Payer: COMMERCIAL

## 2025-04-14 ENCOUNTER — APPOINTMENT (OUTPATIENT)
Dept: CARDIOLOGY | Facility: CLINIC | Age: 25
End: 2025-04-14
Payer: COMMERCIAL

## 2025-04-22 ENCOUNTER — APPOINTMENT (OUTPATIENT)
Dept: CARDIOLOGY | Facility: CLINIC | Age: 25
End: 2025-04-22
Payer: COMMERCIAL

## 2025-04-22 ENCOUNTER — OFFICE VISIT (OUTPATIENT)
Dept: CARDIOLOGY | Facility: CLINIC | Age: 25
End: 2025-04-22
Payer: COMMERCIAL

## 2025-04-22 VITALS
OXYGEN SATURATION: 100 % | HEART RATE: 50 BPM | BODY MASS INDEX: 21.02 KG/M2 | SYSTOLIC BLOOD PRESSURE: 100 MMHG | WEIGHT: 116.8 LBS | DIASTOLIC BLOOD PRESSURE: 60 MMHG

## 2025-04-22 DIAGNOSIS — R55 NEAR SYNCOPE: ICD-10-CM

## 2025-04-22 DIAGNOSIS — R00.2 HEART PALPITATIONS: ICD-10-CM

## 2025-04-22 DIAGNOSIS — G90.A POTS (POSTURAL ORTHOSTATIC TACHYCARDIA SYNDROME): Primary | ICD-10-CM

## 2025-04-22 PROCEDURE — 99212 OFFICE O/P EST SF 10 MIN: CPT | Performed by: STUDENT IN AN ORGANIZED HEALTH CARE EDUCATION/TRAINING PROGRAM

## 2025-04-22 PROCEDURE — 99213 OFFICE O/P EST LOW 20 MIN: CPT | Performed by: STUDENT IN AN ORGANIZED HEALTH CARE EDUCATION/TRAINING PROGRAM

## 2025-04-22 PROCEDURE — 1036F TOBACCO NON-USER: CPT | Performed by: STUDENT IN AN ORGANIZED HEALTH CARE EDUCATION/TRAINING PROGRAM

## 2025-04-22 RX ORDER — MIDODRINE HYDROCHLORIDE 2.5 MG/1
2.5 TABLET ORAL
Qty: 90 TABLET | Refills: 1 | Status: SHIPPED | OUTPATIENT
Start: 2025-04-22 | End: 2025-06-21

## 2025-04-22 ASSESSMENT — ENCOUNTER SYMPTOMS
PALPITATIONS: 1
GASTROINTESTINAL NEGATIVE: 1
EYES NEGATIVE: 1
ALLERGIC/IMMUNOLOGIC NEGATIVE: 1
NEUROLOGICAL NEGATIVE: 1
DYSPNEA ON EXERTION: 0
CONSTITUTIONAL NEGATIVE: 1
MUSCULOSKELETAL NEGATIVE: 1
ENDOCRINE NEGATIVE: 1
HEMATOLOGIC/LYMPHATIC NEGATIVE: 1
SYNCOPE: 0
PND: 0
NEAR-SYNCOPE: 1
ORTHOPNEA: 0
RESPIRATORY NEGATIVE: 1
PSYCHIATRIC NEGATIVE: 1

## 2025-04-22 NOTE — PATIENT INSTRUCTIONS
For your light-headed episodes in the setting of POTS we will add midodrine 2.5 mg three times per day. We will continue propranolol     We will see you back in heart clinic in 6 months or earlier if needed.     Thank you for your visit today. Please contact our office (via [employer] or phone) with any additional questions.     [facility] [place]    Manuela, RN/Clinic Nurse for:    Dr. Kadi Jeter    [address], [address]  [place] [address]    Phone: [phone] Press Option 5 then Option 3 to speak with the Clinic Nurse (Manuela)    _____    To Reach:    Billing Questions -    [phone]  Scheduling / Rescheduling -  Option 1  Refills / Medication Requests -  Option 3  General Office / [place] -  Option 4  Results -     Option 6  Medical Records -    Option 7  Repeat Options -    Option 9
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
## 2025-05-03 DIAGNOSIS — G90.A POTS (POSTURAL ORTHOSTATIC TACHYCARDIA SYNDROME): ICD-10-CM

## 2025-05-03 DIAGNOSIS — R00.2 HEART PALPITATIONS: ICD-10-CM

## 2025-05-05 RX ORDER — PROPRANOLOL HYDROCHLORIDE 60 MG/1
60 CAPSULE, EXTENDED RELEASE ORAL DAILY
Qty: 90 CAPSULE | Refills: 3 | Status: SHIPPED | OUTPATIENT
Start: 2025-05-05

## 2025-05-20 ENCOUNTER — APPOINTMENT (OUTPATIENT)
Dept: OBSTETRICS AND GYNECOLOGY | Facility: CLINIC | Age: 25
End: 2025-05-20
Payer: COMMERCIAL

## 2025-05-27 ENCOUNTER — APPOINTMENT (OUTPATIENT)
Dept: OBSTETRICS AND GYNECOLOGY | Facility: CLINIC | Age: 25
End: 2025-05-27
Payer: COMMERCIAL

## 2025-06-15 ENCOUNTER — APPOINTMENT (OUTPATIENT)
Dept: RADIOLOGY | Facility: HOSPITAL | Age: 25
End: 2025-06-15
Payer: COMMERCIAL

## 2025-06-15 ENCOUNTER — HOSPITAL ENCOUNTER (EMERGENCY)
Facility: HOSPITAL | Age: 25
Discharge: HOME | End: 2025-06-15
Attending: STUDENT IN AN ORGANIZED HEALTH CARE EDUCATION/TRAINING PROGRAM
Payer: COMMERCIAL

## 2025-06-15 VITALS
WEIGHT: 115 LBS | TEMPERATURE: 96.4 F | BODY MASS INDEX: 20.38 KG/M2 | OXYGEN SATURATION: 100 % | RESPIRATION RATE: 15 BRPM | DIASTOLIC BLOOD PRESSURE: 75 MMHG | HEART RATE: 83 BPM | HEIGHT: 63 IN | SYSTOLIC BLOOD PRESSURE: 115 MMHG

## 2025-06-15 DIAGNOSIS — N94.6 MENSTRUAL PAIN: ICD-10-CM

## 2025-06-15 DIAGNOSIS — N83.202 HEMORRHAGIC CYST OF LEFT OVARY: Primary | ICD-10-CM

## 2025-06-15 LAB — PREGNANCY TEST URINE, POC: NEGATIVE

## 2025-06-15 PROCEDURE — 76856 US EXAM PELVIC COMPLETE: CPT

## 2025-06-15 PROCEDURE — 76856 US EXAM PELVIC COMPLETE: CPT | Mod: FOREIGN READ | Performed by: RADIOLOGY

## 2025-06-15 PROCEDURE — 96372 THER/PROPH/DIAG INJ SC/IM: CPT

## 2025-06-15 PROCEDURE — 99284 EMERGENCY DEPT VISIT MOD MDM: CPT | Performed by: STUDENT IN AN ORGANIZED HEALTH CARE EDUCATION/TRAINING PROGRAM

## 2025-06-15 PROCEDURE — 2500000004 HC RX 250 GENERAL PHARMACY W/ HCPCS (ALT 636 FOR OP/ED)

## 2025-06-15 PROCEDURE — 2500000001 HC RX 250 WO HCPCS SELF ADMINISTERED DRUGS (ALT 637 FOR MEDICARE OP)

## 2025-06-15 PROCEDURE — 81025 URINE PREGNANCY TEST: CPT

## 2025-06-15 RX ORDER — KETOROLAC TROMETHAMINE 30 MG/ML
15 INJECTION, SOLUTION INTRAMUSCULAR; INTRAVENOUS ONCE
Status: COMPLETED | OUTPATIENT
Start: 2025-06-15 | End: 2025-06-15

## 2025-06-15 RX ORDER — NAPROXEN 500 MG/1
500 TABLET ORAL
Qty: 10 TABLET | Refills: 0 | Status: SHIPPED | OUTPATIENT
Start: 2025-06-15 | End: 2025-06-20

## 2025-06-15 RX ORDER — NAPROXEN 500 MG/1
500 TABLET ORAL
Qty: 10 TABLET | Refills: 0 | Status: SHIPPED | OUTPATIENT
Start: 2025-06-15 | End: 2025-06-15

## 2025-06-15 RX ORDER — OXYCODONE HYDROCHLORIDE 5 MG/1
5 TABLET ORAL ONCE
Refills: 0 | Status: COMPLETED | OUTPATIENT
Start: 2025-06-15 | End: 2025-06-15

## 2025-06-15 RX ADMIN — OXYCODONE HYDROCHLORIDE 5 MG: 5 TABLET ORAL at 12:02

## 2025-06-15 RX ADMIN — KETOROLAC TROMETHAMINE 15 MG: 30 INJECTION, SOLUTION INTRAMUSCULAR at 13:34

## 2025-06-15 NOTE — ED PROVIDER NOTES
History of Present Illness     History provided by: Patient  Limitations to History: None  External Records Reviewed with Brief Summary: CT on 2024 showed a right corpus luteal cyst of 1.6 cm.    HPI:  Alondra Hood is a 24 y.o. female with POTS and history of ovarian cyst on ultrasound presenting for lower abdominal pain for 1 day.  Patient states her menstrual cycle started yesterday and since she has been having intense, constant cramping that is worse than her normal period.  She is concerned that this could be due to ovarian cyst which she has had in the past.  She denies chest pain, shortness of breath, dysuria, N/V.  She is not on birth control currently and her menstrual bleeding is normal for her.  She denies concern for pregnancy or STIs at this time.  Patient has previously been unable to tolerate pelvic exams and cannot use tampons due to discomfort.      Physical Exam   Triage vitals:  T 35.8 °C (96.4 °F)  HR 83  /75  RR 15  O2 100 %      General: Awake, alert, in no acute distress  Eyes: Gaze conjugate.   HENT: Normo-cephalic, atraumatic. No stridor.   CV: Regular rate, regular rhythm. Radial pulses 2+ bilaterally. No murmurs.  Resp: Breathing non-labored, speaking in full sentences.  Clear to auscultation bilaterally. No wheezing, rales, rhonchi.   GI: Soft, non-distended, tenderness to palpation in lower quadrants with right greater than left.  Mild guarding, no rebound tenderness.  MSK/Extremities: No gross bony deformities. Moving all extremities. No edema.   Skin: Warm. Appropriate color.         Medical Decision Making & ED Course   Medical Decision Makin y.o. female with POTS and history of ovarian cyst on ultrasound presenting for lower abdominal pain for 1 day.  Patient is hemodynamically stable in triage.  Physical exam is notable for lower quadrant abdominal tenderness to palpation with right greater than left.  No palpable masses.  Differential diagnosis includes but is  not limited to ovarian torsion, ovarian cyst, pregnancy, appendicitis, menstrual cramps.  Lower suspicion of appendicitis given onset of pain coinciding with menstrual cycle, pain moving from left to right and cramping in nature.  With history of ovarian cyst and inability to tolerate pelvic exams or transvaginal pelvic ultrasound, obtain transabdominal pelvic ultrasound to assess for ovarian pathology.  Pain control provided with oxy as patient just taken ibuprofen.    Urine pregnancy negative.  Pelvic ultrasound shows hemorrhagic left ovarian cyst measuring 6.3 x 2.8 x 6.1 cm.  Normal Doppler flow both ovaries.  Patient's pain is improved some with oxycodone. Dose of toradol given.  I discussed the findings on the ultrasound with her. Recommended follow up outpatient with her GYN in 1-2 menstrual cycles.  Prescription for naproxen provided for pain control she was at home.  Patient agreeable.  Return precautions provided.      ED Course:  ED Course as of 06/15/25 1534   Sun Amari 15, 2025   1215 Preg Test, Ur: Negative [AC]   1224 US pelvis  IMPRESSION:  Hemorrhagic left ovarian cyst measuring 6.3 x 2.8 x 6.1 cm.  Follow up  is recommended in 1-2 menstrual cycles.  This represents a new  finding.  Normal color and spectral Doppler flow within both ovaries.   [AC]      ED Course User Index  [AC] Roger Washington DO         Diagnoses as of 06/15/25 1534   Hemorrhagic cyst of left ovary   Menstrual pain         Disposition   As a result of the work-up, the patient was discharged home.  she was informed of her diagnosis and instructed to come back with any concerns or worsening of condition.  she and was agreeable to the plan as discussed above.  she was given the opportunity to ask questions.  All of the patient's questions were answered.    Procedures   Procedures    Patient seen and discussed with ED attending physician.    Roger Washington DO  Emergency Medicine PGY1     Roger Washington  DO  Resident  06/15/25 1539

## 2025-06-15 NOTE — ED TRIAGE NOTES
Patient states hx of ovarian cysts- period cramps are worse than normal for her. Concerned for possible cyst.

## 2025-06-15 NOTE — DISCHARGE INSTRUCTIONS
You were evaluated in the emergency department for menstrual pain.  Your ultrasound found a hemorrhagic cyst on your left ovary.  Recommend follow-up with your OB/GYN in 1-2 menstrual cycles for reevaluation.  Prescription for naproxen sent to your pharmacy.  Make sure to take with food.  You can take Pepcid if causing abdominal upset.

## 2025-06-22 ENCOUNTER — PATIENT MESSAGE (OUTPATIENT)
Dept: CARDIOLOGY | Facility: CLINIC | Age: 25
End: 2025-06-22
Payer: COMMERCIAL

## 2025-06-22 DIAGNOSIS — R55 NEAR SYNCOPE: ICD-10-CM

## 2025-06-23 RX ORDER — MIDODRINE HYDROCHLORIDE 2.5 MG/1
2.5 TABLET ORAL
Qty: 270 TABLET | Refills: 3 | Status: SHIPPED | OUTPATIENT
Start: 2025-06-23 | End: 2026-06-23

## 2025-07-21 ENCOUNTER — APPOINTMENT (OUTPATIENT)
Dept: OBSTETRICS AND GYNECOLOGY | Facility: CLINIC | Age: 25
End: 2025-07-21
Payer: COMMERCIAL

## 2025-07-21 VITALS
BODY MASS INDEX: 20.52 KG/M2 | DIASTOLIC BLOOD PRESSURE: 76 MMHG | WEIGHT: 115.8 LBS | HEIGHT: 63 IN | SYSTOLIC BLOOD PRESSURE: 110 MMHG

## 2025-07-21 DIAGNOSIS — N83.202 LEFT OVARIAN CYST: Primary | ICD-10-CM

## 2025-07-21 PROCEDURE — 3008F BODY MASS INDEX DOCD: CPT | Performed by: OBSTETRICS & GYNECOLOGY

## 2025-07-21 PROCEDURE — 1036F TOBACCO NON-USER: CPT | Performed by: OBSTETRICS & GYNECOLOGY

## 2025-07-21 PROCEDURE — 99213 OFFICE O/P EST LOW 20 MIN: CPT | Performed by: OBSTETRICS & GYNECOLOGY

## 2025-07-21 NOTE — PROGRESS NOTES
Subjective   Patient ID: Alondra Hood is a 24 y.o. female who presents for ER Follow-up (Patient here for ED follow up--cycle pain/Pt states her cycles are very heavy and painful--not taking any hormones/Pt would like chaperone--Ryanne Pena CMA present).  HPI  Patient is a 24-year-old  0 para 0 whose last menstrual period was  she said they are typically 1 month apart and she will bleed for about 5 days.  When she had her mid .  She had significant left lower quadrant pain which required an ER visit.  Evaluation did reveal a 6 cm left ovarian cyst no torsion.  Patient states several days after that visit she began to feel better but still has a little bit of achy pain in the left lower quadrant.  She believes she had a cyst about 1 or 2 years ago and had some prior to that as well.  She has tried birth control pills in the past but said within a few days she gets significantly depressed on them and stopped them.  Has never taken them longer than a few days.  Known history of POTS syndrome she denies cigarette smoking currently works as an wing .  Family history negative for breast pelvic or colon cancer.  Review of Systems    Objective   Physical Exam  Abdomen is soft and tender to deep palpation in the left lower quadrant without rebound or guarding.  No palpable masses.  Pelvic: External genitalia normal, unable to pass speculum or finger through the vagina too uncomfortable for patient, she said this is always been an issue.  Assessment/Plan   Diagnoses and all orders for this visit:  Left ovarian cyst  Discussed birth control pills again with patient however because of her past side effect of depression would prefer not to take them.  In light of her feeling better would recommend repeating an ultrasound 3 months from last ultrasound.  Gave patient warnings for torsion or rupture and to call office or go to ER.  She will repeat ultrasound mid-2025 and follow-up in  office a few days later.       Ron Napier MD 07/21/25 3:10 PM

## 2025-09-09 ENCOUNTER — APPOINTMENT (OUTPATIENT)
Dept: OBSTETRICS AND GYNECOLOGY | Facility: CLINIC | Age: 25
End: 2025-09-09
Payer: COMMERCIAL

## 2025-09-11 ENCOUNTER — APPOINTMENT (OUTPATIENT)
Dept: OBSTETRICS AND GYNECOLOGY | Facility: CLINIC | Age: 25
End: 2025-09-11
Payer: COMMERCIAL